# Patient Record
Sex: FEMALE | Race: WHITE | NOT HISPANIC OR LATINO | URBAN - METROPOLITAN AREA
[De-identification: names, ages, dates, MRNs, and addresses within clinical notes are randomized per-mention and may not be internally consistent; named-entity substitution may affect disease eponyms.]

---

## 2021-04-19 ENCOUNTER — TELEPHONE (OUTPATIENT)
Dept: NEUROLOGY | Facility: CLINIC | Age: 66
End: 2021-04-19

## 2021-04-19 NOTE — TELEPHONE ENCOUNTER
Provider:  NEUROLOGY    FROM: [facility]    Patient: HANH TORREZ  MRN:[id number]    ,    THIS PATIENT HAS BEEN REFERRED BY [facility] FOR [employer], PATIENT HAS NO INSURANCE BUT QUALIFIES     FOR THEIR [employer]OVAN. OUR QUESTION TO YOU IS; DO YOU ACCEPT THIS DONOVAN? I CALLED THE     OFFICE AND SHE STATED THAT IT WAS A SCOTT QUESTION, BUT I WAS TOLD THAT IT WOULD BE THE OFFICE     MANAGER WHO COULD ANSWER THIS QUESTION.     CAN YOU PLEASE LET US KNOW ASAP SO WE CAN TAKE THE NEXT STEP WITH SAID PATIENT.    THANK YOU    I SPOKE WITH PATIENT, SHE STATED WHICH EVER SHE COULD GET INTO THE EARLIEST. I TOLD HER WE WOULD START WITH THE [place] OFFICE FIRST.    THANK YOU

## 2021-08-03 ENCOUNTER — OFFICE VISIT (OUTPATIENT)
Dept: NEUROLOGY | Facility: CLINIC | Age: 66
End: 2021-08-03

## 2021-08-03 VITALS
WEIGHT: 220 LBS | HEIGHT: 68 IN | DIASTOLIC BLOOD PRESSURE: 82 MMHG | HEART RATE: 58 BPM | SYSTOLIC BLOOD PRESSURE: 128 MMHG | BODY MASS INDEX: 33.34 KG/M2 | OXYGEN SATURATION: 98 %

## 2021-08-03 DIAGNOSIS — G25.3 MYOCLONUS: Primary | ICD-10-CM

## 2021-08-03 PROCEDURE — 99204 OFFICE O/P NEW MOD 45 MIN: CPT | Performed by: PSYCHIATRY & NEUROLOGY

## 2021-08-03 RX ORDER — FLUTICASONE PROPIONATE 50 MCG
SPRAY, SUSPENSION (ML) NASAL
COMMUNITY

## 2021-08-03 RX ORDER — BICTEGRAVIR SODIUM, EMTRICITABINE, AND TENOFOVIR ALAFENAMIDE FUMARATE 50; 200; 25 MG/1; MG/1; MG/1
1 TABLET ORAL DAILY
COMMUNITY
Start: 2021-06-30

## 2021-08-03 RX ORDER — ATORVASTATIN CALCIUM 20 MG/1
20 TABLET, FILM COATED ORAL DAILY
COMMUNITY

## 2021-08-03 RX ORDER — TIOTROPIUM BROMIDE AND OLODATEROL 3.124; 2.736 UG/1; UG/1
SPRAY, METERED RESPIRATORY (INHALATION) DAILY
COMMUNITY
Start: 2021-07-13

## 2021-08-03 RX ORDER — CETIRIZINE HYDROCHLORIDE 10 MG/1
TABLET ORAL
COMMUNITY
Start: 2021-04-13

## 2021-08-03 RX ORDER — ALBUTEROL SULFATE 90 UG/1
AEROSOL, METERED RESPIRATORY (INHALATION)
COMMUNITY
Start: 2021-03-08

## 2021-08-03 RX ORDER — GABAPENTIN 300 MG/1
CAPSULE ORAL
COMMUNITY
Start: 2021-06-30

## 2021-08-03 RX ORDER — OMEPRAZOLE 40 MG/1
CAPSULE, DELAYED RELEASE ORAL
COMMUNITY
Start: 2021-05-27

## 2021-08-03 RX ORDER — MIRTAZAPINE 30 MG/1
30 TABLET, FILM COATED ORAL
COMMUNITY
Start: 2021-06-24 | End: 2021-09-22

## 2021-08-03 NOTE — PROGRESS NOTES
"Chief Complaint   Patient presents with   • MUSCLE JERKS DURING SLEEP       Patient ID: Sherry Helton is a 66 y.o. female.    HPI: I have had the pleasure of seeing your patient today. As you may know she is a 66-year-old female with a history of jerking of her arms. She says that her symptoms began about a year ago. She says that the jerking occur of the entire arm. It is typically more prominent on the right however she will experience it on the left as well. The do not occur together. She notices her symptoms are occurring if she is fatigued or tired. She sometimes notices her symptoms when she is waking up as well. She denies any focal weakness of her arms. She has no symptoms in her legs. She says that occasionally she will notice that when it jerks it will occur multiple times in a row. She can go anywhere from a few days to a couple of weeks without her symptoms occurring. Then she says that it may occur several times in a row. She denies any bowel or bladder loss with her symptoms. No loss of consciousness. No loss of awareness. She does have a history of chronic neck pain. She says that she was told she may need a cervical \"fusion\" surgery. However she says that she was not wanting to do so she denies any radiating pain into her arms when this occurs.    The following portions of the patient's history were reviewed and updated as appropriate: allergies, current medications, past family history, past medical history, past social history, past surgical history and problem list.    Review of Systems   Constitutional: Negative for activity change, appetite change and fatigue.   HENT: Negative for tinnitus, trouble swallowing and voice change.    Eyes: Negative for photophobia, pain and visual disturbance.   Respiratory: Negative for chest tightness, shortness of breath and wheezing.    Cardiovascular: Negative for chest pain, palpitations and leg swelling.   Gastrointestinal: Negative for abdominal pain, " nausea and vomiting.   Endocrine: Negative for cold intolerance, heat intolerance and polydipsia.   Musculoskeletal: Negative for back pain, gait problem and neck pain.   Skin: Negative for color change, rash and wound.   Neurological: Positive for tremors (MUSCLE JERKS WHILE SLEEPING and tired. ). Negative for dizziness, seizures, syncope, facial asymmetry, speech difficulty, weakness, light-headedness, numbness and headaches.   Hematological: Negative for adenopathy. Does not bruise/bleed easily.   Psychiatric/Behavioral: Positive for sleep disturbance (MUSCLE JERKS WHILE SLEEPIN G). Negative for agitation, behavioral problems, confusion, decreased concentration, dysphoric mood, hallucinations, self-injury and suicidal ideas. The patient is not nervous/anxious and is not hyperactive.       I have reviewed the review of systems above performed by my medical assistant.      Vitals:    08/03/21 1005   BP: 128/82   Pulse: 58   SpO2: 98%       Neurologic Exam     Mental Status   Oriented to person, place, and time.   Registration: recalls 3 of 3 objects. Follows 3 step commands.   Attention: normal. Concentration: normal.   Speech: speech is normal   Level of consciousness: alert  Knowledge: consistent with education (No deficits found.).   Normal comprehension.     Cranial Nerves     CN II   Visual fields full to confrontation.     CN III, IV, VI   Pupils are equal, round, and reactive to light.  Extraocular motions are normal.   CN III: no CN III palsy  CN VI: no CN VI palsy  Nystagmus: none   Diplopia: none    CN V   Facial sensation intact.     CN VII   Facial expression full, symmetric.     CN VIII   CN VIII normal.     CN IX, X   CN IX normal.   CN X normal.     CN XI   CN XI normal.     CN XII   CN XII normal.     Motor Exam   Muscle bulk: normal  Right arm tone: normal  Left arm tone: normal  Right leg tone: normal  Left leg tone: normal    Strength   Right neck flexion: 5/5  Left neck flexion: 5/5  Right neck  extension: 5/5  Left neck extension: 5/5  Right deltoid: 5/5  Left deltoid: 5/5  Right biceps: 5/5  Left biceps: 5/5  Right triceps: 5/5  Left triceps: 5/5  Right wrist flexion: 5/5  Left wrist flexion: 5/5  Right wrist extension: 5/5  Left wrist extension: 5/5  Right interossei: 5/5  Left interossei: 5/5  Right abdominals: 5/5  Left abdominals: 5/5  Right iliopsoas: 5/5  Left iliopsoas: 5/5  Right quadriceps: 5/5  Left quadriceps: 5/5  Right hamstrin/5  Left hamstrin/5  Right glutei: 5/5  Left glutei: 5/5  Right anterior tibial: 5/5  Left anterior tibial: 5/5  Right posterior tibial: 5/5  Left posterior tibial: 5/5  Right peroneal: 5/5  Left peroneal: 5/5  Right gastroc: 5/5  Left gastroc: 5/5    Sensory Exam   Light touch normal.   Vibration normal.   Proprioception normal.   Pinprick normal.     Gait, Coordination, and Reflexes     Gait  Gait: normal    Coordination   Romberg: negative    Tremor   Resting tremor: absent  Intention tremor: absent    Reflexes   Right brachioradialis: 2+  Left brachioradialis: 2+  Right biceps: 2+  Left biceps: 2+  Right triceps: 2+  Left triceps: 2+  Right patellar: 2+  Left patellar: 2+  Right achilles: 2+  Left achilles: 2+  Right : 2+  Left : 2+Station is normal.       Physical Exam  Vitals reviewed.   Constitutional:       General: She is not in acute distress.     Appearance: She is well-developed.   HENT:      Head: Normocephalic and atraumatic.   Eyes:      Extraocular Movements: EOM normal.      Pupils: Pupils are equal, round, and reactive to light.   Cardiovascular:      Rate and Rhythm: Normal rate and regular rhythm.      Heart sounds: Normal heart sounds.   Pulmonary:      Effort: Pulmonary effort is normal. No respiratory distress.      Breath sounds: Normal breath sounds.   Abdominal:      General: Bowel sounds are normal. There is no distension.      Palpations: Abdomen is soft.      Tenderness: There is no abdominal tenderness.   Musculoskeletal:          General: No deformity.      Cervical back: Normal range of motion.   Skin:     General: Skin is warm.      Findings: No rash.   Neurological:      Mental Status: She is oriented to person, place, and time.      Coordination: Romberg Test normal.      Gait: Gait is intact.      Deep Tendon Reflexes:      Reflex Scores:       Tricep reflexes are 2+ on the right side and 2+ on the left side.       Bicep reflexes are 2+ on the right side and 2+ on the left side.       Brachioradialis reflexes are 2+ on the right side and 2+ on the left side.       Patellar reflexes are 2+ on the right side and 2+ on the left side.       Achilles reflexes are 2+ on the right side and 2+ on the left side.  Psychiatric:         Speech: Speech normal.         Judgment: Judgment normal.         Procedures    Assessment/Plan: I would like to schedule an MRI of her brain and cervical spine. We will also schedule an EEG. Return to clinic after testing.       Diagnoses and all orders for this visit:    1. Myoclonus (Primary)  -     EEG Awake or Asleep Routine; Future  -     MRI Brain With & Without Contrast; Future  -     MRI Cervical Spine With & Without Contrast; Future           Turner Mina II, MD